# Patient Record
Sex: FEMALE | Race: WHITE | NOT HISPANIC OR LATINO | URBAN - METROPOLITAN AREA
[De-identification: names, ages, dates, MRNs, and addresses within clinical notes are randomized per-mention and may not be internally consistent; named-entity substitution may affect disease eponyms.]

---

## 2020-11-10 NOTE — PATIENT DISCUSSION
Discussed with the patient the prophylactic/therapeutic indications for Laser Peripheral Iridotomy (LPI) procedure for this condition vs cataract surgery.

## 2020-11-16 NOTE — PATIENT DISCUSSION
pt qualified for B vs B+ vs CV vs Advanced IOL NICHOLAS OU. Pt leaning towards Advanced IOL OU. OS 1st Symfony NICHOLAS. OD 2nd TMF NICHOLAS. Pt wants to be scheduled in January 2021.

## 2021-03-09 NOTE — PATIENT DISCUSSION
Cataract surgery has been performed in the first eye and activities of daily living are still impaired. The patient would like to proceed with cataract surgery in the second eye as scheduled. The patient elects IOL OD +3.25 TMF.

## 2021-11-05 ENCOUNTER — ESTABLISHED PATIENT (OUTPATIENT)
Dept: URBAN - METROPOLITAN AREA CLINIC 16 | Facility: CLINIC | Age: 26
End: 2021-11-05

## 2021-11-05 DIAGNOSIS — H52.13: ICD-10-CM

## 2021-11-05 DIAGNOSIS — Z01.00: ICD-10-CM

## 2021-11-05 PROCEDURE — 92015 DETERMINE REFRACTIVE STATE: CPT

## 2021-11-05 PROCEDURE — 92014 COMPRE OPH EXAM EST PT 1/>: CPT

## 2021-11-05 ASSESSMENT — TONOMETRY
OD_IOP_MMHG: 19
OS_IOP_MMHG: 20

## 2023-04-21 NOTE — PATIENT DISCUSSION
ED Attending Physician Note      Patient : Josselyn Mcfadden Age: 65 year old Sex: female   MRN: [id number] Encounter Date: 4/20/2023      History     Chief Complaint   Patient presents with   • Palpitations   • Chest Pain Adult       HPI:  64 yo F presents after an episode of palpitations. States it woke her up from her sleep. She had lightheadedness with this. This happened a few days ago as well. Wakes her up out of her sleep.  She denies any chest pain or shortness of breath.  She denies any recent illness.  Denies any recent drug or alcohol use.  No heavy caffeine use recently.  Patient does note that she has brought this up with her primary care provider and that he told her to see him if it continues to happen.  She currently does not have the symptoms.    Allergies   Allergen Reactions   • Sulfa Antibiotics RASH and Other (See Comments)       Discharge Medication List as of 4/21/2023  1:05 AM      Prior to Admission Medications    Details   alendronate (FOSAMAX) 70 MG tablet TAKE 1 TABLET BY MOUTH ONE TIME PER WEEKHistorical Med      Calcium Carb-Cholecalciferol 600-20 MG-MCG Tab every 24 hours.Historical Med, EVERY 24 HOURSPer the FDA, the units of measure for the vitamin D or cholecalciferol component have changed from international units (IUs) to MCG or micrograms. See below for common equivalencies: 2.5 mcg = 100 units, 5 m cg = 200 units, 6. 25 mcg = 250 units, 10 mcg = 400 units, 12.5 mcg = 500 units, 20 mcg = 800 units      FLUoxetine (PROzac) 10 MG capsule Take 10 mg by mouth daily.Historical Med      levothyroxine 50 MCG tablet TAKE 1/2 TABLET BY MOUTH ON MONDAY, WEDNESDAY, AND FRIDAY, AND 1 TABLET THE OTHER DAYS OF THE WEEK.Historical Med      montelukast (SINGULAIR) 10 MG tablet every 24 hours.Historical Med      Omeprazole Magnesium (PRILOSEC PO) PrilosecHistorical Med      pregabalin (LYRICA) 50 MG capsule Historical Med      zaleplon (SONATA) 10 MG capsule TAKE 1 CAPSULE BY MOUTH AT BEDTIME AS  The patient feels that the cataract is significantly impacting daily activities and has elected cataract surgery. The risks, benefits, and alternatives to surgery were discussed. The patient elects to proceed with surgery. NEEDED FOR INSOMNIA. DO NOT MIX WITH ALCOHOLHistorical Med      loratadine (Claritin) 10 MG tablet Take 1 tablet by mouth daily for 10 days.OTC, Disp-10 tablet, R-0      fluticasone (FLONASE) 50 MCG/ACT nasal spray Spray 1 spray in each nostril in the morning and 1 spray in the evening. Do all this for 10 days.OTC, Disp-1 each, R-0             Discharge Medication List as of 4/21/2023  1:05 AM          Past Medical History:   Diagnosis Date   • Allergies    • Anxiety disorder    • Asthmatic bronchitis    • GERD (gastroesophageal reflux disease)    • Hypothyroidism    • Osteoporosis        Past Surgical History:   Procedure Laterality Date   • FOOT/TOES SURGERY PROC UNLISTED         No family history on file.    Social History     Tobacco Use   • Smoking status: Never   • Smokeless tobacco: Never       Review of Systems   Constitutional: Negative for chills and fever.   HENT: Negative for rhinorrhea and sore throat.    Eyes: Negative for discharge and visual disturbance.   Respiratory: Negative for cough and shortness of breath.    Cardiovascular: Positive for palpitations. Negative for chest pain.   Gastrointestinal: Negative for abdominal pain and vomiting.   Genitourinary: Negative for dysuria and hematuria.   Musculoskeletal: Negative for joint swelling and myalgias.   Skin: Negative for rash and wound.   Neurological: Negative for seizures and weakness.       Physical Exam     Patient Vitals for the past 24 hrs:   BP Temp Pulse Resp SpO2 Height Weight   04/21/23 0100 125/74 -- 68 15 97 % -- --   04/20/23 2330 122/72 -- 71 16 97 % -- --   04/20/23 2305 -- -- -- -- 98 % -- --   04/20/23 2245 124/74 98 °F (36.7 °C) 77 18 98 % 5' 4\" (1.626 m) 61.2 kg (135 lb)         Physical Exam  Constitutional:       General: She is not in acute distress.     Appearance: Normal appearance. She is not toxic-appearing.   HENT:      Head: Normocephalic and atraumatic.      Nose: Nose normal.      Mouth/Throat:      Mouth: Mucous  membranes are moist.      Pharynx: Oropharynx is clear.      Neck: Normal range of motion and neck supple.   Eyes:      Extraocular Movements: Extraocular movements intact.      Conjunctiva/sclera: Conjunctivae normal.      Pupils: Pupils are equal, round, and reactive to light.   Cardiovascular:      Rate and Rhythm: Normal rate and regular rhythm.      Heart sounds: No murmur heard.     No gallop.   Pulmonary:      Effort: Pulmonary effort is normal. No respiratory distress.      Breath sounds: Normal breath sounds. No wheezing or rales.   Abdominal:      General: Abdomen is flat. There is no distension.      Palpations: Abdomen is soft.      Tenderness: There is no abdominal tenderness. There is no guarding or rebound.   Musculoskeletal:         General: No swelling or deformity. Normal range of motion.   Skin:     General: Skin is warm and dry.      Capillary Refill: Capillary refill takes less than 2 seconds.      Findings: No rash.   Neurological:      General: No focal deficit present.      Mental Status: She is alert and oriented to person, place, and time.      Cranial Nerves: No cranial nerve deficit.      Motor: No weakness.   Psychiatric:         Mood and Affect: Mood normal.         Behavior: Behavior normal.         ED Course     Procedures    Lab Results     Results for orders placed or performed during the hospital encounter of 04/20/23   Comprehensive Metabolic Panel   Result Value Ref Range    Fasting Status      Sodium 145 135 - 145 mmol/L    Potassium 3.8 3.4 - 5.1 mmol/L    Chloride 106 97 - 110 mmol/L    Carbon Dioxide 28 21 - 32 mmol/L    Anion Gap 15 7 - 19 mmol/L    Glucose 111 (H) 70 - 99 mg/dL    BUN 17 6 - 20 mg/dL    Creatinine 0.70 0.51 - 0.95 mg/dL    Glomerular Filtration Rate >90 >=60    BUN/Cr 24 7 - 25    Calcium 8.9 8.4 - 10.2 mg/dL    Bilirubin, Total 0.2 0.2 - 1.0 mg/dL    GOT/AST 19 <=37 Units/L    GPT/ALT 27 <64 Units/L    Alkaline Phosphatase 53 45 - 117 Units/L    Albumin  3.7 3.6 - 5.1 g/dL    Protein, Total 7.3 6.4 - 8.2 g/dL    Globulin 3.6 2.0 - 4.0 g/dL    A/G Ratio 1.0 1.0 - 2.4   TROPONIN I, HIGH SENSITIVITY   Result Value Ref Range    Troponin I, High Sensitivity 4 <52 ng/L   CBC with Automated Differential (performable only)   Result Value Ref Range    WBC 8.8 4.2 - 11.0 K/mcL    RBC 4.45 4.00 - 5.20 mil/mcL    HGB 13.3 12.0 - 15.5 g/dL    HCT 40.7 36.0 - 46.5 %    MCV 91.5 78.0 - 100.0 fl    MCH 29.9 26.0 - 34.0 pg    MCHC 32.7 32.0 - 36.5 g/dL    RDW-CV 13.0 11.0 - 15.0 %    RDW-SD 43.7 39.0 - 50.0 fL     140 - 450 K/mcL    NRBC 0 <=0 /100 WBC    Neutrophil, Percent 37 %    Lymphocytes, Percent 52 %    Mono, Percent 8 %    Eosinophils, Percent 2 %    Basophils, Percent 1 %    Immature Granulocytes 0 %    Absolute Neutrophils 3.3 1.8 - 7.7 K/mcL    Absolute Lymphocytes 4.6 (H) 1.0 - 4.0 K/mcL    Absolute Monocytes 0.7 0.3 - 0.9 K/mcL    Absolute Eosinophils  0.2 0.0 - 0.5 K/mcL    Absolute Basophils 0.1 0.0 - 0.3 K/mcL    Absolute Immature Granulocytes 0.0 0.0 - 0.2 K/mcL   Light Blue Top   Result Value Ref Range    Extra Tube Hold for Add Ons    Gold Top   Result Value Ref Range    Extra Tube Hold for Add Ons    NT proBNP   Result Value Ref Range    NT-proBNP 100 <=125 pg/mL   Thyroid Stimulating Hormone   Result Value Ref Range    TSH 3.765 0.350 - 5.000 mcUnits/mL       EKG Results   Indication: palpitations   Interpretation: EKG shows normal sinus rhythm, normal axis, with non-specific ST changes, occassional PVCs interpreted by myself.    Rhythm Strip Interpretation:   Indication: palpitations   Interpretation: Rhythm strip analyzed by myself shows sinus rhythm, normal rate, with no ectopy.      Radiology Results     Imaging Results          XR CHEST PA OR AP 1 VIEW (Final result)  Result time 04/20/23 23:26:32    Final result                 Impression:      1.   No radiographically evident acute cardiopulmonary process.      Electronically Signed by: NAM VILAL  MARILEE TAPIA.   Signed on: 4/20/2023 11:26 PM   Workstation ID: [id number]             Narrative:    EXAM: XR CHEST AP OR PA    HISTORY: Chest Pain    COMPARISON: 10/28/2022.    FINDINGS:    Heart/Mediastinum: Normal.    Pulmonary Vessels: Normal.    Lungs: No consolidation.    Pleura: No pleural effusion or pneumothorax.    Other: None.                                ED Medication Orders (From admission, onward)    None          ED Course as of 04/21/23 0901   Thu Apr 20, 2023   2346 Ekg with NSR with occasional PVCs, no stemi or significant std.  [ED]      ED Course User Index  [ED] Michelle Moss MD       [facility]     65-year-old female presents after an episode of palpitations that woke her up from her sleep.  Patient has had this in the past few days ago.  No other symptoms.  Patient is currently asymptomatic.  Patient arrives with stable vital signs.  She has normal sinus rhythm on the monitor.  Her EKG does show occasional PVCs.     Multiple diagnoses were considered including but not limited to electrolyte disturbance versus ACS versus arrhythmia versus hyperthyroid.    We will check electrolytes as well as cardiac enzymes.  We will keep patient on telemetry while awaiting results in the ER.  I suspect patient would likely benefit from a Holter monitor.  If work-up in the ER is negative we will suggest that she follows up close with her primary care provider to have this ordered as an outpatient.    Work-up wnl. Pt reassessed, still w/o sx. Discharged home with instructions for close pcp follow-up.     Clinical Impression     ED Diagnosis   1. Palpitations            Disposition        Discharge 4/21/2023  1:05 AM  Josselyn Mcfadden discharge to home/self care.          Michelle Moss MD   4/21/2023 11:11 PM                      Michelle Moss MD  04/21/23 0902

## 2023-10-03 ENCOUNTER — NEW PATIENT (OUTPATIENT)
Dept: URBAN - METROPOLITAN AREA CLINIC 17 | Facility: CLINIC | Age: 28
End: 2023-10-03

## 2023-10-03 DIAGNOSIS — H52.13: ICD-10-CM

## 2023-10-03 PROCEDURE — 92014 COMPRE OPH EXAM EST PT 1/>: CPT

## 2023-10-03 PROCEDURE — 92015 DETERMINE REFRACTIVE STATE: CPT

## 2023-10-03 ASSESSMENT — KERATOMETRY
OD_AXISANGLE2_DEGREES: 66
OD_K2POWER_DIOPTERS: 43.00
OS_AXISANGLE_DEGREES: 178
OS_AXISANGLE2_DEGREES: 88
OS_K2POWER_DIOPTERS: 43.00
OD_AXISANGLE_DEGREES: 156
OS_K1POWER_DIOPTERS: 42.75
OD_K1POWER_DIOPTERS: 42.50

## 2023-10-03 ASSESSMENT — TONOMETRY
OD_IOP_MMHG: 18
OS_IOP_MMHG: 18

## 2023-10-03 ASSESSMENT — VISUAL ACUITY
OD_CC: 20/20
OS_CC: 20/20

## 2024-12-03 NOTE — PATIENT DISCUSSION
Recommended artificial tears to use: 1 drop 4x a day in both eyes. <-- Click to add NO significant Past Surgical History